# Patient Record
Sex: MALE | Race: WHITE | Employment: FULL TIME | URBAN - METROPOLITAN AREA
[De-identification: names, ages, dates, MRNs, and addresses within clinical notes are randomized per-mention and may not be internally consistent; named-entity substitution may affect disease eponyms.]

---

## 2019-06-21 ENCOUNTER — HOSPITAL ENCOUNTER (EMERGENCY)
Facility: CLINIC | Age: 49
Discharge: HOME | End: 2019-06-22
Attending: EMERGENCY MEDICINE | Admitting: EMERGENCY MEDICINE
Payer: COMMERCIAL

## 2019-06-21 DIAGNOSIS — I48.91 NEW ONSET ATRIAL FIBRILLATION (H): ICD-10-CM

## 2019-06-21 DIAGNOSIS — I48.91 ATRIAL FIBRILLATION WITH RVR (H): ICD-10-CM

## 2019-06-21 LAB
ANION GAP SERPL CALCULATED.3IONS-SCNC: 3 MMOL/L (ref 3–14)
BASOPHILS # BLD AUTO: 0 10E9/L (ref 0–0.2)
BASOPHILS NFR BLD AUTO: 0.6 %
BUN SERPL-MCNC: 16 MG/DL (ref 7–30)
CALCIUM SERPL-MCNC: 9.7 MG/DL (ref 8.5–10.1)
CHLORIDE SERPL-SCNC: 109 MMOL/L (ref 94–109)
CO2 SERPL-SCNC: 33 MMOL/L (ref 20–32)
CREAT SERPL-MCNC: 0.96 MG/DL (ref 0.66–1.25)
DIFFERENTIAL METHOD BLD: NORMAL
EOSINOPHIL # BLD AUTO: 0.1 10E9/L (ref 0–0.7)
EOSINOPHIL NFR BLD AUTO: 1.7 %
ERYTHROCYTE [DISTWIDTH] IN BLOOD BY AUTOMATED COUNT: 12.4 % (ref 10–15)
GFR SERPL CREATININE-BSD FRML MDRD: >90 ML/MIN/{1.73_M2}
GLUCOSE SERPL-MCNC: 122 MG/DL (ref 70–99)
HCT VFR BLD AUTO: 45.1 % (ref 40–53)
HGB BLD-MCNC: 15.2 G/DL (ref 13.3–17.7)
IMM GRANULOCYTES # BLD: 0 10E9/L (ref 0–0.4)
IMM GRANULOCYTES NFR BLD: 0.1 %
LYMPHOCYTES # BLD AUTO: 2.5 10E9/L (ref 0.8–5.3)
LYMPHOCYTES NFR BLD AUTO: 36.1 %
MAGNESIUM SERPL-MCNC: 2.2 MG/DL (ref 1.6–2.3)
MCH RBC QN AUTO: 30.3 PG (ref 26.5–33)
MCHC RBC AUTO-ENTMCNC: 33.7 G/DL (ref 31.5–36.5)
MCV RBC AUTO: 90 FL (ref 78–100)
MONOCYTES # BLD AUTO: 0.5 10E9/L (ref 0–1.3)
MONOCYTES NFR BLD AUTO: 7.7 %
NEUTROPHILS # BLD AUTO: 3.8 10E9/L (ref 1.6–8.3)
NEUTROPHILS NFR BLD AUTO: 53.8 %
NRBC # BLD AUTO: 0 10*3/UL
NRBC BLD AUTO-RTO: 0 /100
PLATELET # BLD AUTO: 172 10E9/L (ref 150–450)
POTASSIUM SERPL-SCNC: 3.6 MMOL/L (ref 3.4–5.3)
RBC # BLD AUTO: 5.01 10E12/L (ref 4.4–5.9)
SODIUM SERPL-SCNC: 145 MMOL/L (ref 133–144)
TROPONIN I SERPL-MCNC: <0.015 UG/L (ref 0–0.04)
TSH SERPL DL<=0.005 MIU/L-ACNC: 2.62 MU/L (ref 0.4–4)
WBC # BLD AUTO: 7 10E9/L (ref 4–11)

## 2019-06-21 PROCEDURE — 83735 ASSAY OF MAGNESIUM: CPT | Performed by: EMERGENCY MEDICINE

## 2019-06-21 PROCEDURE — 96374 THER/PROPH/DIAG INJ IV PUSH: CPT

## 2019-06-21 PROCEDURE — 80048 BASIC METABOLIC PNL TOTAL CA: CPT | Performed by: EMERGENCY MEDICINE

## 2019-06-21 PROCEDURE — 40000275 ZZH STATISTIC RCP TIME EA 10 MIN

## 2019-06-21 PROCEDURE — 96361 HYDRATE IV INFUSION ADD-ON: CPT

## 2019-06-21 PROCEDURE — 84484 ASSAY OF TROPONIN QUANT: CPT | Performed by: EMERGENCY MEDICINE

## 2019-06-21 PROCEDURE — 25000128 H RX IP 250 OP 636: Performed by: EMERGENCY MEDICINE

## 2019-06-21 PROCEDURE — 96360 HYDRATION IV INFUSION INIT: CPT

## 2019-06-21 PROCEDURE — 99152 MOD SED SAME PHYS/QHP 5/>YRS: CPT

## 2019-06-21 PROCEDURE — 93005 ELECTROCARDIOGRAM TRACING: CPT | Mod: 76

## 2019-06-21 PROCEDURE — 93005 ELECTROCARDIOGRAM TRACING: CPT

## 2019-06-21 PROCEDURE — 99285 EMERGENCY DEPT VISIT HI MDM: CPT | Mod: 25

## 2019-06-21 PROCEDURE — 27211117 ZZH CARDIOVERT/DEFIB/PACER SUPP

## 2019-06-21 PROCEDURE — 85025 COMPLETE CBC W/AUTO DIFF WBC: CPT | Performed by: EMERGENCY MEDICINE

## 2019-06-21 PROCEDURE — 92960 CARDIOVERSION ELECTRIC EXT: CPT

## 2019-06-21 PROCEDURE — 84443 ASSAY THYROID STIM HORMONE: CPT | Performed by: EMERGENCY MEDICINE

## 2019-06-21 RX ORDER — PROPOFOL 10 MG/ML
200 INJECTION, EMULSION INTRAVENOUS ONCE
Status: COMPLETED | OUTPATIENT
Start: 2019-06-21 | End: 2019-06-21

## 2019-06-21 RX ORDER — LIDOCAINE 40 MG/G
CREAM TOPICAL
Status: DISCONTINUED | OUTPATIENT
Start: 2019-06-21 | End: 2019-06-22 | Stop reason: HOSPADM

## 2019-06-21 RX ORDER — SODIUM CHLORIDE 9 MG/ML
1000 INJECTION, SOLUTION INTRAVENOUS CONTINUOUS
Status: DISCONTINUED | OUTPATIENT
Start: 2019-06-21 | End: 2019-06-22 | Stop reason: HOSPADM

## 2019-06-21 RX ADMIN — PROPOFOL 80 MG: 10 INJECTION, EMULSION INTRAVENOUS at 23:45

## 2019-06-21 RX ADMIN — SODIUM CHLORIDE 1000 ML: 9 INJECTION, SOLUTION INTRAVENOUS at 22:16

## 2019-06-21 ASSESSMENT — ENCOUNTER SYMPTOMS
PALPITATIONS: 1
UNEXPECTED WEIGHT CHANGE: 0

## 2019-06-21 NOTE — ED AVS SNAPSHOT
[facility]  [address]  [facility] [address]  Phone:  [phone]  Fax:  [phone]                                    Dayday Carnes   MRN: [id number]    Department:  [facility]   Date of Visit:  6/21/2019           After Visit Summary Signature Page    I have received my discharge instructions, and my questions have been answered. I have discussed any challenges I see with this plan with the nurse or doctor.    ..........................................................................................................................................  Patient/Patient Representative Signature      ..........................................................................................................................................  Patient Representative Print Name and Relationship to Patient    ..................................................               ................................................  Date                                   Time    ..........................................................................................................................................  Reviewed by Signature/Title    ...................................................              ..............................................  Date                                               Time          22EPIC Rev 08/18

## 2019-06-22 VITALS
RESPIRATION RATE: 14 BRPM | WEIGHT: 187.39 LBS | SYSTOLIC BLOOD PRESSURE: 135 MMHG | DIASTOLIC BLOOD PRESSURE: 97 MMHG | HEART RATE: 84 BPM | BODY MASS INDEX: 26.15 KG/M2 | OXYGEN SATURATION: 95 % | TEMPERATURE: 98.7 F

## 2019-06-22 LAB
INTERPRETATION ECG - MUSE: NORMAL
INTERPRETATION ECG - MUSE: NORMAL

## 2019-06-22 ASSESSMENT — ENCOUNTER SYMPTOMS: SHORTNESS OF BREATH: 1

## 2019-06-22 NOTE — PROGRESS NOTES
RT- Attended conscious sedation. Ambu bag and suction set up and ready if needed. Patient was placed on ETCO2 with oxygen. Stayed until patient was awake and alert.     Nic Dyer, RT  June 21, 2019 11:51 PM

## 2019-06-22 NOTE — ED PROVIDER NOTES
History     Chief Complaint:  Irregular Heart Beat    HPI   Dayday Carnes is a 48 year old male who presents with irregular heart beat. The patient states that around 2030 his heart started to beat irregularly which caused him to breath irregularly. He states his shirt seemed to moving with his heart beat. The patient notes this has happened several times in the past but has never lasted this long. He denies chest pain, leg swelling, diaphoresis, unexpected weight change, abdominal pain, diarrhea, vomiting, hair loss, or shortness of breath. The patient denies any family history of irregular blood pressure or early heart disease. The patient denies use of caffeine, excessive alcohol, energy drinks, recent infections, stress, or change in sleep patterns    Cardiac/PE/DVT Risk Factors:  History of hypertension - no  History of hyperlipidemia - no  History of diabetes - no  History of smoking - yes, former smoker  Family history of heart complications - no  Travel- no  Surgery-no    Allergies:  No Known Allergies     Medications:    Lorazepam    Past Medical History:    Renal disease  Anxiety  Migraine    Past Surgical History:    Laser holmium lithotripsy ureters, insert stent   Clavicle surgery   Salome teeth extraction  Bell's palsy     Family History:    Father: hypertension  Brother: hypertension    Social History:  The patient was accompanied to the ED by wife.  Smoking Status: former smoker  Smokeless Tobacco: Never Used  Alcohol Use: Positive  Marital Status:        Review of Systems   Constitutional: Negative for unexpected weight change.   Respiratory: Positive for shortness of breath ( on onset of palpitations, now resolved).    Cardiovascular: Positive for palpitations. Negative for chest pain.   All other systems reviewed and are negative.    Physical Exam     Patient Vitals for the past 24 hrs:   BP Temp Temp src Pulse Heart Rate Resp SpO2 Weight   06/22/19 0015 (!) 135/97 -- -- 84 -- 14 95 %  --   06/22/19 0000 (!) 135/97 -- -- 85 84 12 95 % --   06/21/19 2345 (!) 140/93 -- -- 90 96 10 98 % --   06/21/19 2330 (!) 126/93 -- -- 156 161 12 99 % --   06/21/19 2315 (!) 152/96 -- -- 168 161 15 98 % --   06/21/19 2310 (!) 139/94 -- -- -- -- (!) 47 92 % --   06/21/19 2300 -- -- -- -- 163 29 98 % --   06/21/19 2255 -- -- -- -- 163 14 97 % --   06/21/19 2245 (!) 144/103 -- -- 163 144 13 100 % --   06/21/19 2230 136/88 -- -- 184 158 10 100 % --   06/21/19 2215 (!) 171/97 -- -- 131 160 25 95 % --   06/21/19 2202 (!) 170/127 98.7  F (37.1  C) Temporal 82 -- 18 100 % 85 kg (187 lb 6.3 oz)     Physical Exam  General: Adult male sitting upright  Eyes: PERRL, Conjunctive within normal limits  ENT: Moist mucous membranes, oropharynx clear.  Neck: No palpable thyromegaly.   CV: Normal S1S2. Tachycardic. Irregularly, irregular.  Resp: Clear to auscultation bilaterally, no wheezes, rales or rhonchi. Normal respiratory effort.  GI: Abdomen is soft, nontender and nondistended. No palpable masses. No rebound or guarding.  MSK: No edema. Nontender. Normal active range of motion.  Skin: Warm and dry. No rashes or lesions or ecchymoses on visible skin.  Neuro: Alert and oriented. Responds appropriately to all questions and commands. No focal findings appreciated. Normal muscle tone.  Psych: Normal mood and affect. Pleasant.    Emergency Department Course   ECG:  ECG taken at 2208, ECG read at 2215  Atrial fibrillation with rapid ventricular response  Minimal voltage criteria for LVH, may be normal variant  Marked ST abnormality, possible inferior subendocardial injury  Abnormal ECG  Rate 160 bpm. LA interval * ms. QRS duration 84 ms. QT/QTc 260/424 ms. P-R-T axes * 52 -90.    ECG:  ECG taken at 2345, ECG read at 2350  Normal sinus rhythm  Normal ECG  Rate 92 bpm. LA interval 144 ms. QRS duration 88 ms. QT/QTc 326/403 ms. P-R-T axes 51 36 39.    Laboratory:  Laboratory findings were communicated with the patient who voiced  understanding of the findings.    CBC: WBC 7.0, HGB 15.2,   BMP: (H), carbon dioxide 33(H), glucose 122(H) o/w WNL (Creatinine 0.96)  Troponin (Collected 2208): <0.015  Magnesium: 2.2  TSH: 2.62    Procedures:    [facility] Procedure Note        Sedation     Performed by: Laverne Atwood MD  Authorized by: Laverne Atwood MD    Pre-Procedure Assessment done at 2330.    Expected Level:  Moderate Sedation    Indication:  Sedation is required to allow for Cardioversion    Consent obtained from patient after discussing the risks, benefits and alternatives.    PO Intake:  Appropriately NPO for procedure    ASA Class:  Class 1 - HEALTHY PATIENT    Mallampati:  Grade 1:  Soft palate, uvula, tonsillar pillars, and posterior pharyngeal wall visible    Lungs: Lungs Clear with good breath sounds bilaterally.     Heart: normal heart sounds with tachycardia    History and physical reviewed and no updates needed. I have reviewed the lab findings, diagnostic data, medications, and the plan for sedation. I have determined this patient to be an appropriate candidate for the planned sedation and procedure and have reassessed the patient IMMEDIATELY PRIOR to sedation and procedure.      Sedation Post Procedure Summary:    Prior to the start of the procedure and with procedural staff participation, I verbally confirmed the patient s identity using two indicators, relevant allergies, that the procedure was appropriate and matched the consent or emergent situation, and that the correct equipment/implants were available. Immediately prior to starting the procedure I conducted the Time Out with the procedural staff and re-confirmed the patient s name, procedure, and site/side. (The Joint Commission universal protocol was followed.)  Yes      Sedatives: Propofol    Vital signs, airway, End Tidal CO2 and pulse oximetry were monitored and remained stable throughout the procedure and sedation was maintained until the  procedure was complete.  The patient was monitored by staff until sedation discharge criteria were met.    Patient tolerance: Patient tolerated the procedure well with no immediate complications.    Time of sedation in minutes:  10 minutes from beginning to end of physician one to one monitoring.    Electrical Cardioversion Procedure Note:         Indication:  Atrial Fibrillation        Consent:  Risks (including but not limited to: arrhythmia, stroke or death),  benefits and alternatives were discussed with patient and consent for procedure was obtained.      Timeout:  Universal protocol was followed.  TIME OUT conducted just    Prior to starting procedure confirmed patient identity, site/side, procedure    Patient position, and availability of correct equipment and implants:    Yes      Medication: Propofol (Diprivan) 80 mg IV for moderate sedation      Procedure note:  Electrodes were placed  in the AP position,   Trial 1:  Synchronized shock at  120 J was not successful and Trial 2:  Synchronized shock at  150 J  was successful    Patient Status:  Patient tolerated the procedure well.  There were no complications.     Interventions:  2216 NS 1000 mL IV  2345 Propofol 80 mg IV    Emergency Department Course:  Nursing notes and vitals reviewed.  2208 IV was inserted and blood was drawn for laboratory testing, results above.    2214 I performed an exam of the patient as documented above.     2312 I returned to update the patient. We discussed the risks and benefits of cardioversion, and he feels comfortable with cardioversion at this time.     2340 I preformed the Cardioversion procedure as noted above. Patient tolerated this well and was in normal sinus rhythm afterward.    0010 I reassessed the patient. He denies any symptoms and states he feels well.  Prior to discharge, I personally reviewed the laboratory results with the patient and answered all related questions . Patient voiced understanding.     Impression  & Plan      Medical Decision Making:  Dayday Carnes is a 48 year old male who presents for evaluation of palpitations, onset at 2030 today with very clear onset.  This is consistent with atrial fibrillation with rapid ventricular response.  Based on acute symptoms less than 48 hours, very clear story, good historian and after obtaining informed consent, electrical cardioversion was successful in converting rhythm back to normal sinus.   I doubt acute coronary syndrome, thyroid issues, PE, dissection, drug ingestion, acute electrolyte imbalance, etc.  Labs and CXR look ok. Repeat EKG looks excellent. He is asymptomatic after cardioversion now and would not hospitalize. He has a CHADSVASC score of 0 (one if you include elevated blood pressure today, although has no h/o hypertension). No indication for anticoagulation. Discussed with patient and he is in agreement. Follow-up PCP within 3 days and should consider seeing cardiology within 1-2 weeks.    Diagnosis:    ICD-10-CM    1. Atrial fibrillation with RVR (H) I48.91    2. New onset atrial fibrillation (H) I48.91       Disposition:   The patient is discharged to home.      Scribe Disclosure:  I, Munaameena Allen, am serving as a scribe at 10:13 PM on 6/21/2019 to document services personally performed by Laverne Atwood MD based on my observations and the provider's statements to me.  [facility] EMERGENCY DEPARTMENT       Laverne Atwood MD  06/22/19 0213       Laverne Atwood MD  06/22/19 0224

## 2019-09-30 ENCOUNTER — HEALTH MAINTENANCE LETTER (OUTPATIENT)
Age: 49
End: 2019-09-30

## 2021-01-15 ENCOUNTER — HEALTH MAINTENANCE LETTER (OUTPATIENT)
Age: 51
End: 2021-01-15

## 2021-07-17 ENCOUNTER — APPOINTMENT (OUTPATIENT)
Dept: CT IMAGING | Facility: CLINIC | Age: 51
End: 2021-07-17
Attending: EMERGENCY MEDICINE
Payer: COMMERCIAL

## 2021-07-17 ENCOUNTER — HOSPITAL ENCOUNTER (EMERGENCY)
Facility: CLINIC | Age: 51
Discharge: HOME | End: 2021-07-17
Attending: EMERGENCY MEDICINE | Admitting: EMERGENCY MEDICINE
Payer: COMMERCIAL

## 2021-07-17 VITALS
HEART RATE: 74 BPM | OXYGEN SATURATION: 95 % | DIASTOLIC BLOOD PRESSURE: 79 MMHG | RESPIRATION RATE: 20 BRPM | SYSTOLIC BLOOD PRESSURE: 146 MMHG | TEMPERATURE: 98.2 F

## 2021-07-17 DIAGNOSIS — N20.0 NEPHROLITHIASIS: ICD-10-CM

## 2021-07-17 DIAGNOSIS — R31.29 MICROSCOPIC HEMATURIA: ICD-10-CM

## 2021-07-17 DIAGNOSIS — N20.1 URETEROLITHIASIS: ICD-10-CM

## 2021-07-17 LAB
ALBUMIN UR-MCNC: NEGATIVE MG/DL
ANION GAP SERPL CALCULATED.3IONS-SCNC: 4 MMOL/L (ref 3–14)
APPEARANCE UR: CLEAR
BASOPHILS # BLD AUTO: 0 10E3/UL (ref 0–0.2)
BASOPHILS NFR BLD AUTO: 0 %
BILIRUB UR QL STRIP: NEGATIVE
BUN SERPL-MCNC: 18 MG/DL (ref 7–30)
CALCIUM SERPL-MCNC: 9.3 MG/DL (ref 8.5–10.1)
CHLORIDE BLD-SCNC: 109 MMOL/L (ref 94–109)
CO2 SERPL-SCNC: 28 MMOL/L (ref 20–32)
COLOR UR AUTO: ABNORMAL
CREAT SERPL-MCNC: 1.13 MG/DL (ref 0.66–1.25)
EOSINOPHIL # BLD AUTO: 0 10E3/UL (ref 0–0.7)
EOSINOPHIL NFR BLD AUTO: 0 %
ERYTHROCYTE [DISTWIDTH] IN BLOOD BY AUTOMATED COUNT: 12.2 % (ref 10–15)
GFR SERPL CREATININE-BSD FRML MDRD: 75 ML/MIN/1.73M2
GLUCOSE BLD-MCNC: 149 MG/DL (ref 70–99)
GLUCOSE UR STRIP-MCNC: NEGATIVE MG/DL
HCT VFR BLD AUTO: 43.4 % (ref 40–53)
HGB BLD-MCNC: 14.4 G/DL (ref 13.3–17.7)
HGB UR QL STRIP: ABNORMAL
IMM GRANULOCYTES # BLD: 0 10E3/UL
IMM GRANULOCYTES NFR BLD: 0 %
KETONES UR STRIP-MCNC: NEGATIVE MG/DL
LEUKOCYTE ESTERASE UR QL STRIP: NEGATIVE
LYMPHOCYTES # BLD AUTO: 1 10E3/UL (ref 0.8–5.3)
LYMPHOCYTES NFR BLD AUTO: 12 %
MCH RBC QN AUTO: 29.6 PG (ref 26.5–33)
MCHC RBC AUTO-ENTMCNC: 33.2 G/DL (ref 31.5–36.5)
MCV RBC AUTO: 89 FL (ref 78–100)
MONOCYTES # BLD AUTO: 0.4 10E3/UL (ref 0–1.3)
MONOCYTES NFR BLD AUTO: 5 %
MUCOUS THREADS #/AREA URNS LPF: PRESENT /LPF
NEUTROPHILS # BLD AUTO: 6.9 10E3/UL (ref 1.6–8.3)
NEUTROPHILS NFR BLD AUTO: 83 %
NITRATE UR QL: NEGATIVE
NRBC # BLD AUTO: 0 10E3/UL
NRBC BLD AUTO-RTO: 0 /100
PH UR STRIP: 7 [PH] (ref 5–7)
PLATELET # BLD AUTO: 165 10E3/UL (ref 150–450)
POTASSIUM BLD-SCNC: 3.8 MMOL/L (ref 3.4–5.3)
RBC # BLD AUTO: 4.86 10E6/UL (ref 4.4–5.9)
RBC URINE: 93 /HPF
SODIUM SERPL-SCNC: 141 MMOL/L (ref 133–144)
SP GR UR STRIP: 1 (ref 1–1.03)
SQUAMOUS EPITHELIAL: <1 /HPF
UROBILINOGEN UR STRIP-MCNC: NORMAL MG/DL
WBC # BLD AUTO: 8.3 10E3/UL (ref 4–11)
WBC URINE: 5 /HPF

## 2021-07-17 PROCEDURE — 36592 COLLECT BLOOD FROM PICC: CPT | Performed by: EMERGENCY MEDICINE

## 2021-07-17 PROCEDURE — 99285 EMERGENCY DEPT VISIT HI MDM: CPT | Mod: 25

## 2021-07-17 PROCEDURE — 96375 TX/PRO/DX INJ NEW DRUG ADDON: CPT

## 2021-07-17 PROCEDURE — 36415 COLL VENOUS BLD VENIPUNCTURE: CPT | Performed by: EMERGENCY MEDICINE

## 2021-07-17 PROCEDURE — 250N000011 HC RX IP 250 OP 636: Performed by: EMERGENCY MEDICINE

## 2021-07-17 PROCEDURE — 81001 URINALYSIS AUTO W/SCOPE: CPT | Performed by: EMERGENCY MEDICINE

## 2021-07-17 PROCEDURE — 85025 COMPLETE CBC W/AUTO DIFF WBC: CPT | Performed by: EMERGENCY MEDICINE

## 2021-07-17 PROCEDURE — 96361 HYDRATE IV INFUSION ADD-ON: CPT

## 2021-07-17 PROCEDURE — 258N000003 HC RX IP 258 OP 636: Performed by: EMERGENCY MEDICINE

## 2021-07-17 PROCEDURE — 96374 THER/PROPH/DIAG INJ IV PUSH: CPT | Mod: 59

## 2021-07-17 PROCEDURE — 80048 BASIC METABOLIC PNL TOTAL CA: CPT | Performed by: EMERGENCY MEDICINE

## 2021-07-17 PROCEDURE — 74177 CT ABD & PELVIS W/CONTRAST: CPT

## 2021-07-17 RX ORDER — KETOROLAC TROMETHAMINE 15 MG/ML
15 INJECTION, SOLUTION INTRAMUSCULAR; INTRAVENOUS ONCE
Status: COMPLETED | OUTPATIENT
Start: 2021-07-17 | End: 2021-07-17

## 2021-07-17 RX ORDER — IBUPROFEN 600 MG/1
600 TABLET, FILM COATED ORAL EVERY 6 HOURS PRN
Qty: 40 TABLET | Refills: 0 | Status: SHIPPED | OUTPATIENT
Start: 2021-07-17 | End: 2021-07-27

## 2021-07-17 RX ORDER — TAMSULOSIN HYDROCHLORIDE 0.4 MG/1
0.4 CAPSULE ORAL DAILY
Qty: 7 CAPSULE | Refills: 0 | Status: SHIPPED | OUTPATIENT
Start: 2021-07-17 | End: 2021-07-24

## 2021-07-17 RX ORDER — HYDROCODONE BITARTRATE AND ACETAMINOPHEN 5; 325 MG/1; MG/1
1 TABLET ORAL EVERY 6 HOURS PRN
Qty: 12 TABLET | Refills: 0 | Status: SHIPPED | OUTPATIENT
Start: 2021-07-17

## 2021-07-17 RX ORDER — MORPHINE SULFATE 4 MG/ML
4 INJECTION, SOLUTION INTRAMUSCULAR; INTRAVENOUS ONCE
Status: COMPLETED | OUTPATIENT
Start: 2021-07-17 | End: 2021-07-17

## 2021-07-17 RX ORDER — IOPAMIDOL 755 MG/ML
94 INJECTION, SOLUTION INTRAVASCULAR ONCE
Status: COMPLETED | OUTPATIENT
Start: 2021-07-17 | End: 2021-07-17

## 2021-07-17 RX ORDER — ONDANSETRON 2 MG/ML
4 INJECTION INTRAMUSCULAR; INTRAVENOUS ONCE
Status: COMPLETED | OUTPATIENT
Start: 2021-07-17 | End: 2021-07-17

## 2021-07-17 RX ORDER — ONDANSETRON 4 MG/1
4 TABLET, ORALLY DISINTEGRATING ORAL EVERY 8 HOURS PRN
Qty: 10 TABLET | Refills: 0 | Status: SHIPPED | OUTPATIENT
Start: 2021-07-17

## 2021-07-17 RX ADMIN — SODIUM CHLORIDE 1000 ML: 9 INJECTION, SOLUTION INTRAVENOUS at 06:56

## 2021-07-17 RX ADMIN — MORPHINE SULFATE 4 MG: 4 INJECTION INTRAVENOUS at 06:57

## 2021-07-17 RX ADMIN — KETOROLAC TROMETHAMINE 15 MG: 15 INJECTION, SOLUTION INTRAMUSCULAR; INTRAVENOUS at 06:56

## 2021-07-17 RX ADMIN — IOPAMIDOL 94 ML: 755 INJECTION, SOLUTION INTRAVENOUS at 07:58

## 2021-07-17 RX ADMIN — ONDANSETRON 4 MG: 2 INJECTION INTRAMUSCULAR; INTRAVENOUS at 06:56

## 2021-07-17 ASSESSMENT — ENCOUNTER SYMPTOMS
DYSURIA: 0
HEMATURIA: 0
FEVER: 0
VOMITING: 1
DIFFICULTY URINATING: 0
ABDOMINAL PAIN: 1
DIARRHEA: 0

## 2021-07-17 NOTE — DISCHARGE INSTRUCTIONS
Your kidney stone will likely pass on its own. To help, take Flomax until it has passed. Strain your urine to look for stone.  If you have any side effects of Flomax you can stop this and talk with urology about a different option.  Use Motrin for mild pain and Norco for moderate-severe pain.  Use Zofran as needed for nausea or vomiting.  Drink lots of water.  Limit caffeine.  Follow up with urology.  Return to the ER with uncontrolled pain, fever >100.4F, decreased urine output, or any other new or concerning symptoms.

## 2021-07-17 NOTE — ED PROVIDER NOTES
"  History   Chief Complaint:  Abdominal Pain      The history is provided by the patient.      Dayday Carnes is a 50 year old male with a history of kidney stones and atrial fibrillation (not anticoagulated) who presents for evaluation of abdominal pain. 2 nights ago he had right lower abdominal pain. The pain resolved spontaneously and he had no pain during the day yesterday. However, early this morning, about 3 hours prior to arrival, the right lower abdominal pain returned. He rates it as 710 currently and it has been associated with vomiting. He denies pain radiating to his flank or groin and remarks it is dissimilar to when he had kidney stones primarily due to the location. He has had no diarrhea, black/bloody stools, fever, dysuria, hematuria, or difficulty urinating.     Of note, his son was vomiting yesterday, but he and his wife attribute this to a \"stomach bug\".     Review of Systems   Constitutional: Negative for fever.   Gastrointestinal: Positive for abdominal pain, nausea and vomiting. Negative for blood in stool, constipation and diarrhea.   Genitourinary: Negative for difficulty urinating, dysuria, flank pain, hematuria and testicular pain.   All other systems reviewed and are negative.    Allergies:  No Known Allergies    Medications:    Ativan    Past Medical History:    Kidney stones  Migraine with aura  Anxiety  Afib  Ritter's palsy    Past Surgical History:    Combined cystoscopy, ureteroscopy, laser holmium lithotripsy ureters, insert stent - left   Denies abdominal surgeries    Family History:    Hypertension    Social History:  Marital Status:   PCP: [facility]  Presents to the ED with his wife  Has a son    Physical Exam     Patient Vitals for the past 24 hrs:   BP Temp Temp src Pulse Resp SpO2   07/17/21 0730 (!) 146/79 -- -- 74 -- 95 %   07/17/21 0715 (!) 144/83 -- -- 58 -- 94 %   07/17/21 0700 (!) 147/89 -- -- 70 -- 97 %   07/17/21 0546 (!) 164/85 98.2  F " (36.8  C) Oral 79 20 98 %       Physical Exam  General: Well-developed and well-nourished. Middle aged  man actively vomiting. Cooperative.  Head:  Atraumatic.  Eyes:  Conjunctivae, lids, and sclerae are normal.  Neck:  Supple. Normal range of motion.  CV:  Regular rate and rhythm. Normal heart sounds with no murmurs, rubs, or gallops detected.  Resp:  No respiratory distress. Clear to auscultation bilaterally without decreased breath sounds, wheezing, rales, or rhonchi.  GI:  Soft. Non-distended. Mild tenderness to deep palpation in the RLQ. No CVA tenderness.    MS:  Normal ROM.  Skin:  Warm. Non-diaphoretic. No pallor.  Neuro:  Awake. A&Ox3. Normal strength.  Psych: Normal mood and affect. Normal speech.  Vitals reviewed.    Emergency Department Course     Imaging:  CT Abd/pelvis with contrast:  1.  Obstructing 0.4 cm stone in the proximal right ureter causes mild   right hydronephrosis and delayed enhancement of the right kidney.   2.  Several nonobstructing stones in both kidneys measure 0.3 cm or   smaller.      Reading per radiology    Laboratory:    CBC: WBC: 8.3, HGB: 14.1, PLT: 165    BMP: Glucose 149 (H) o/w WNL (Creatinine: 1.13)    UA: Blood: Large, RBC: 93 (H), Mucous: Present, o/w Negative     Emergency Department Course:    Reviewed:  I reviewed the his nursing notes, vitals, past medical records, and Care Everywhere.     Assessments:  0637 I first assessed Dayday and performed an exam. Discussed plans for care.    0945 I rechecked and updated him on his results. He is feeling better and tolerated PO. Discussed plans for discharge.    Interventions:  0656: NS 1L IV Bolus   0656: Zofran 4 mg IV  0656: Toradol 15 mg IV  0657: Morphine 4 mg IV    Disposition:  He was discharged home.   Impression & Plan    Medical Decision Making:   Dayday is a 50 year old man who had self-limiting episode of right lower quadrant abdominal pain about 27 hours prior to arrival.  He felt well during the day but  about 3 hours prior to arrival had recurrence of right lower quadrant abdominal pain associated with vomiting which prompted his visit.  He denies any other concerns and appears well on exam, although somewhat uncomfortable as he is actively vomiting.  He does have some tenderness to deep palpation in the right lower quadrant without any other significant findings including no CVA tenderness.  Given the colicky nature of his pain I was concerned for ureterolithiasis.  However, the location in the right lower quadrant was also concerning for appendicitis.  CT of the abdomen and pelvis with contrast was completed which reveals a 4 mm proximal right ureterolithiasis.  There is also incidental bilateral nephrolithiasis.  Laboratory studies are overall reassuring without leukocytosis, anemia, kidney injury, electrolyte derangements, or UTI.  Microscopic hematuria is noted as expected.  Fortunately, he felt improved with morphine, Toradol, Zofran, and IV fluids.  He was able to tolerate PO.  At this point he is appropriate for discharge home with trial of passage.  I will initiate Flomax and I will have him strain his urine.  He thinks he may have had a reaction to Flomax in the past which seems to be mild (red eyes).  I will have him try this and if he has side effects, stop the medication and contact urology.  For pain he will use Motrin for mild pain and Norco for moderate to severe pain.  I also provided him Zofran as needed for nausea and vomiting.  We discussed the importance of close urology follow-up as well as a very low threshold to return if he has uncontrolled pain, fever, or any other new or concerning symptoms.  All of his and his wife's questions were answered and they verbalized understanding.  Amenable to discharge.    Diagnosis:     ICD-10-CM    1. Ureterolithiasis  N20.1    2. Nephrolithiasis  N20.0    3. Microscopic hematuria  R31.29        Discharge Medications:  Discharge Medication List as of  7/17/2021  9:58 AM      START taking these medications    Details   HYDROcodone-acetaminophen (NORCO) 5-325 MG tablet Take 1 tablet by mouth every 6 hours as needed for pain, Disp-12 tablet, R-0, Local Print      ibuprofen (ADVIL/MOTRIN) 600 MG tablet Take 1 tablet (600 mg) by mouth every 6 hours as needed for moderate pain, Disp-40 tablet, R-0, Local Print      tamsulosin (FLOMAX) 0.4 MG capsule Take 1 capsule (0.4 mg) by mouth daily for 7 days, Disp-7 capsule, R-0, Local Print              Scribe Disclosure:  I, Cherrie Cazares, am serving as a scribe on 7/17/2021 at 6:37 AM to personally document services performed by Angle Felipe MD based on my observations and the provider's statements to me.         Angle Felipe MD  07/19/21 2340

## 2021-07-19 ASSESSMENT — ENCOUNTER SYMPTOMS
BLOOD IN STOOL: 0
NAUSEA: 1
CONSTIPATION: 0
FLANK PAIN: 0

## 2021-10-24 ENCOUNTER — HEALTH MAINTENANCE LETTER (OUTPATIENT)
Age: 51
End: 2021-10-24

## 2022-02-13 ENCOUNTER — HEALTH MAINTENANCE LETTER (OUTPATIENT)
Age: 52
End: 2022-02-13

## 2022-10-16 ENCOUNTER — HEALTH MAINTENANCE LETTER (OUTPATIENT)
Age: 52
End: 2022-10-16

## 2023-03-26 ENCOUNTER — HEALTH MAINTENANCE LETTER (OUTPATIENT)
Age: 53
End: 2023-03-26

## 2024-06-01 ENCOUNTER — HEALTH MAINTENANCE LETTER (OUTPATIENT)
Age: 54
End: 2024-06-01